# Patient Record
Sex: FEMALE | Race: WHITE | Employment: OTHER | URBAN - METROPOLITAN AREA
[De-identification: names, ages, dates, MRNs, and addresses within clinical notes are randomized per-mention and may not be internally consistent; named-entity substitution may affect disease eponyms.]

---

## 2022-01-20 ENCOUNTER — APPOINTMENT (OUTPATIENT)
Dept: CT IMAGING | Age: 67
End: 2022-01-20
Attending: NURSE PRACTITIONER
Payer: MEDICARE

## 2022-01-20 ENCOUNTER — HOSPITAL ENCOUNTER (OUTPATIENT)
Age: 67
Discharge: HOME | End: 2022-01-20
Payer: MEDICARE

## 2022-01-20 VITALS
TEMPERATURE: 97 F | OXYGEN SATURATION: 96 % | DIASTOLIC BLOOD PRESSURE: 82 MMHG | SYSTOLIC BLOOD PRESSURE: 147 MMHG | HEART RATE: 62 BPM | RESPIRATION RATE: 16 BRPM

## 2022-01-20 DIAGNOSIS — S09.92XA INJURY OF NOSE, INITIAL ENCOUNTER: Primary | ICD-10-CM

## 2022-01-20 DIAGNOSIS — S00.33XA CONTUSION OF NOSE, INITIAL ENCOUNTER: ICD-10-CM

## 2022-01-20 PROCEDURE — 70486 CT MAXILLOFACIAL W/O DYE: CPT | Performed by: NURSE PRACTITIONER

## 2022-01-20 PROCEDURE — 99213 OFFICE O/P EST LOW 20 MIN: CPT | Performed by: NURSE PRACTITIONER

## 2022-01-20 PROCEDURE — 76377 3D RENDER W/INTRP POSTPROCES: CPT | Performed by: NURSE PRACTITIONER

## 2022-01-20 RX ORDER — CLOBETASOL PROPIONATE 0.5 MG/G
1 OINTMENT TOPICAL DAILY
COMMUNITY
Start: 2022-01-06

## 2022-01-20 RX ORDER — LEVOTHYROXINE SODIUM 0.07 MG/1
TABLET ORAL
COMMUNITY
Start: 2022-01-11

## 2022-01-20 RX ORDER — BLOOD SUGAR DIAGNOSTIC
1 STRIP MISCELLANEOUS 2 TIMES DAILY
COMMUNITY
Start: 2022-01-01

## 2022-01-20 RX ORDER — EPINEPHRINE 0.3 MG/.3ML
0.3 INJECTION SUBCUTANEOUS
COMMUNITY
Start: 2020-09-21

## 2022-01-20 RX ORDER — ERGOCALCIFEROL (VITAMIN D2) 1250 MCG
1 CAPSULE ORAL
COMMUNITY
Start: 2021-12-14

## 2022-01-20 RX ORDER — ARMODAFINIL 150 MG/1
150 TABLET ORAL AS DIRECTED
COMMUNITY
Start: 2019-09-20

## 2022-01-20 RX ORDER — PNV NO.95/FERROUS FUM/FOLIC AC 28MG-0.8MG
TABLET ORAL EVERY OTHER DAY
COMMUNITY

## 2022-01-20 RX ORDER — FLUCONAZOLE 150 MG/1
150 TABLET ORAL AS DIRECTED
COMMUNITY
Start: 2021-11-03

## 2022-01-20 RX ORDER — MAGNESIUM OXIDE 400 MG (241.3 MG MAGNESIUM) TABLET
TABLET
COMMUNITY

## 2022-01-20 RX ORDER — AZELASTINE HYDROCHLORIDE 0.5 MG/ML
1 SOLUTION/ DROPS OPHTHALMIC 2 TIMES DAILY
COMMUNITY

## 2022-01-20 RX ORDER — EXENATIDE 2 MG/.85ML
2 INJECTION, SUSPENSION, EXTENDED RELEASE SUBCUTANEOUS
COMMUNITY
Start: 2021-12-20
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
## 2023-08-17 ENCOUNTER — HOSPITAL ENCOUNTER (OUTPATIENT)
Age: 68
Discharge: HOME | End: 2023-08-17
Payer: MEDICARE

## 2023-08-17 VITALS
HEIGHT: 67 IN | BODY MASS INDEX: 36.1 KG/M2 | WEIGHT: 230 LBS | HEART RATE: 73 BPM | OXYGEN SATURATION: 97 % | RESPIRATION RATE: 18 BRPM | DIASTOLIC BLOOD PRESSURE: 78 MMHG | SYSTOLIC BLOOD PRESSURE: 126 MMHG | TEMPERATURE: 98 F

## 2023-08-17 DIAGNOSIS — S39.012A LUMBAR STRAIN, INITIAL ENCOUNTER: Primary | ICD-10-CM

## 2023-08-17 LAB
BILIRUB UR QL STRIP: NEGATIVE
CLARITY UR: CLEAR
COLOR UR: YELLOW
GLUCOSE UR STRIP-MCNC: NEGATIVE MG/DL
HGB UR QL STRIP: NEGATIVE
KETONES UR STRIP-MCNC: NEGATIVE MG/DL
LEUKOCYTE ESTERASE UR QL STRIP: NEGATIVE
NITRITE UR QL STRIP: NEGATIVE
PH UR STRIP: 5 [PH]
PROT UR STRIP-MCNC: NEGATIVE MG/DL
SP GR UR STRIP: >=1.03
UROBILINOGEN UR STRIP-ACNC: <2 MG/DL

## 2023-08-17 PROCEDURE — 99213 OFFICE O/P EST LOW 20 MIN: CPT | Performed by: NURSE PRACTITIONER

## 2023-08-17 PROCEDURE — 81002 URINALYSIS NONAUTO W/O SCOPE: CPT | Performed by: NURSE PRACTITIONER

## 2023-08-17 RX ORDER — ORPHENADRINE CITRATE 100 MG/1
100 TABLET, EXTENDED RELEASE ORAL 2 TIMES DAILY
Qty: 20 EACH | Refills: 0 | Status: SHIPPED | OUTPATIENT
Start: 2023-08-17 | End: 2023-08-27

## 2023-08-17 RX ORDER — METHYLPREDNISOLONE 4 MG/1
TABLET ORAL
Qty: 1 EACH | Refills: 0 | Status: SHIPPED | OUTPATIENT
Start: 2023-08-17

## 2023-08-17 NOTE — ED INITIAL ASSESSMENT (HPI)
May Pt c/o intermittent lower back ache  June Pt Pt states back pain continues but did vomit with pink blood  July pain started to radiate to hip. Last wk Pt noted increased lower back pain, red blood to stool. Denies: fevers, issues with urination.

## 2024-04-21 ENCOUNTER — HOSPITAL ENCOUNTER (OUTPATIENT)
Age: 69
Discharge: OTHER INSTITUTION | End: 2024-04-21
Payer: MEDICARE

## 2024-04-21 VITALS
TEMPERATURE: 98 F | DIASTOLIC BLOOD PRESSURE: 94 MMHG | SYSTOLIC BLOOD PRESSURE: 144 MMHG | HEART RATE: 74 BPM | RESPIRATION RATE: 18 BRPM | OXYGEN SATURATION: 96 %

## 2024-04-21 DIAGNOSIS — R42 DIZZINESS: Primary | ICD-10-CM

## 2024-04-21 LAB — GLUCOSE BLD-MCNC: 96 MG/DL (ref 70–99)

## 2024-04-21 NOTE — ED PROVIDER NOTES
Patient Seen in: [facility]      History   No chief complaint on file.    Stated Complaint: dizzy    Subjective:   68-year-old female accompanied by a friend.  States that since Wednesday night Thursday, she started having dizziness.  Not sure if it is vertigo, as she has had vertigo in the past.  States it feels little bit different.  She denies chest pain.  She did have some nausea, vomiting, diarrhea, which has mostly resolved.  She has been drinking fluids including Gatorade.  States she is not had a dizziness quite like this in the past.  States she did not hit her head or lost consciousness.  Denies chest pain or shortness of breath.  She is a diabetic.  We did check a blood sugar and it was 96.            Objective:   No pertinent past medical history.            No pertinent past surgical history.              No pertinent social history.            Review of Systems   Constitutional: Negative.    Respiratory: Negative.     Cardiovascular: Negative.    Gastrointestinal: Negative.    Genitourinary: Negative.    Neurological:  Positive for dizziness and light-headedness.   All other systems reviewed and are negative.      Positive for stated complaint: dizzy  Other systems are as noted in HPI.  Constitutional and vital signs reviewed.      All other systems reviewed and negative except as noted above.    Physical Exam     ED Triage Vitals [04/21/24 1526]   BP (!) 144/94   Pulse 74   Resp 18   Temp 97.6 °F (36.4 °C)   Temp src Temporal   SpO2 96 %   O2 Device None (Room air)       Current:BP (!) 144/94   Pulse 74   Temp 97.6 °F (36.4 °C) (Temporal)   Resp 18   SpO2 96%         Physical Exam  Vitals and nursing note reviewed.   Constitutional:       General: She is not in acute distress.     Appearance: Normal appearance. She is not ill-appearing, toxic-appearing or diaphoretic.   HENT:      Mouth/Throat:      Lips: Pink.      Mouth: No angioedema.      Pharynx: Oropharynx is clear. Uvula  midline.   Cardiovascular:      Rate and Rhythm: Normal rate and regular rhythm.      Pulses: Normal pulses.      Heart sounds: Normal heart sounds.   Pulmonary:      Effort: Pulmonary effort is normal. No respiratory distress.      Breath sounds: Normal breath sounds.   Skin:     General: Skin is warm and dry.      Coloration: Skin is not jaundiced or pale.      Findings: No rash.   Neurological:      General: No focal deficit present.      Mental Status: She is alert and oriented to person, place, and time.   Psychiatric:         Mood and Affect: Mood normal.         Behavior: Behavior normal.               ED Course     Labs Reviewed   POCT GLUCOSE - Normal                             [facility]                                         Medical Decision Making  68-year-old female who has had dizziness for the last 3 days.  States that she does have a history of vertigo, however she feels that it feels different.  No vision changes.  No direct head injury or LOC.  No chest pain, no shortness of breath.  Normal blood sugar.  Vital signs are stable.  Due to the nature of her complaints, and limitations to her study at this time including the possible need for advanced imaging, I did recommend to her she go to the ER for further evaluation.  I did offer an ambulance ride which she refused.  States that she will have her friend accompany her to [facility] emergency department.  She understands to drive directly there, and to pull over to the side road and call 911 if she gets worse.  She did leave in stable condition.        Speech recognition software was used during this dictation.  There may be minor errors in transcription.      Amount and/or Complexity of Data Reviewed  Labs: ordered. Decision-making details documented in ED Course.        Disposition and Plan     Clinical Impression:  1. Dizziness         Disposition:  Ic to ed  4/21/2024  3:36 pm    Follow-up:  No follow-up provider specified.        Medications  Prescribed:  Current Discharge Medication List

## 2024-04-21 NOTE — ED INITIAL ASSESSMENT (HPI)
Wed/Thurs Pt c/o N/V/D which Pt states is better.    Thurs AM Pt states she was having dizziness/light headed    Denies: fevers, pain    Pt ambulates to exam room 1 without complications.

## 2025-05-28 ENCOUNTER — OFFICE VISIT (OUTPATIENT)
Dept: OBGYN CLINIC | Facility: CLINIC | Age: 70
End: 2025-05-28

## 2025-05-28 VITALS — BODY MASS INDEX: 34.37 KG/M2 | WEIGHT: 219 LBS | HEIGHT: 67 IN

## 2025-05-28 DIAGNOSIS — N95.2 POSTMENOPAUSAL ATROPHIC VAGINITIS: ICD-10-CM

## 2025-05-28 DIAGNOSIS — N90.4 LICHEN SCLEROSUS OF VULVA: Primary | ICD-10-CM

## 2025-05-28 PROCEDURE — 99203 OFFICE O/P NEW LOW 30 MIN: CPT | Performed by: OBSTETRICS & GYNECOLOGY

## 2025-05-28 RX ORDER — CLOBETASOL PROPIONATE 0.5 MG/G
OINTMENT TOPICAL
Qty: 60 G | Refills: 3 | Status: SHIPPED | OUTPATIENT
Start: 2025-05-28 | End: 2025-07-02

## 2025-05-28 RX ORDER — ESTRADIOL 0.1 MG/G
CREAM VAGINAL
Qty: 42.5 G | Refills: 4 | Status: SHIPPED | OUTPATIENT
Start: 2025-05-28

## 2025-05-28 NOTE — PROGRESS NOTES
[facility]  Obstetrics and Gynecology  Gynecology Visit    Chief Complaint   Patient presents with    Gyn Exam           Silva Gonzalez is a 69 year old female who presents for gyn exam.    LMP: postmenopausal.    Menses regular: n/a.    Menstrual flow normal: n/a.    Birth control or HRT:  0.   Refill 0  Last Pap Smear: n/a.  Any history of abnormal paps: n/a   Last MMG: n/a  Any Medication Refills needed today?: no  Sleep: 7-8 hours.    Diet: balanced.    Exercise: occasional.   Screening labs/Blood work today: n/a.     Colonoscopy (if over 46 y/o): n/a.   Gardasil:(age 9-46 y/o) n/a.   Genetic Cancer screen (if indicated): no.   Flu (Aug-April): n/a.TDAP (every 10 years) n/a.      Additional Problems/concerns: vaginal irritation.      Next Appt: n/a    Immunization History   Administered Date(s) Administered    Covid-19 Vaccine Moderna 100 mcg/0.5 ml 2021, 2021, 2021, 2022    Covid-19 Vaccine Pfizer Bivalent 30mcg/0.3mL 2023    Pfizer Covid-19 Vaccine 30mcg/0.3ml 12yrs+ 2024       Medications - Current[1]    Allergies[2]    OB History   No obstetric history on file.       Gyn History       No data recorded       No data to display                    Past Medical History[3]    Past Surgical History[4]    Family History[5]     Tobacco  Med Hx  Surg Hx  Fam Hx  Soc Hx        Social History     Socioeconomic History    Marital status: Single     Spouse name: Not on file    Number of children: Not on file    Years of education: Not on file    Highest education level: Not on file   Occupational History    Not on file   Tobacco Use    Smoking status: Former     Current packs/day: 0.00     Types: Cigarettes     Quit date: 2008     Years since quittin.0     Passive exposure: Never    Smokeless tobacco: Never    Tobacco comments:     Quit    Vaping Use    Vaping status: Never Used   Substance and Sexual Activity    Alcohol use: Yes     Comment: 2 drinks per week on  the high side    Drug use: Not Currently     Types: Cannabis     Comment: as per directed by Dr. Murillo, never applied for [place]-cost    Sexual activity: Not on file   Other Topics Concern     Service Not Asked    Blood Transfusions Not Asked    Caffeine Concern Yes     Comment: 2 drinks/day    Occupational Exposure Not Asked    Hobby Hazards Not Asked    Sleep Concern Not Asked    Stress Concern Not Asked    Weight Concern Not Asked    Special Diet Not Asked    Back Care Not Asked    Exercise Not Asked    Bike Helmet Not Asked    Seat Belt Not Asked    Self-Exams Not Asked   Social History Narrative    Not on file     Social Drivers of Health     Food Insecurity: No Food Insecurity (3/4/2025)    Received from [facility]    Food Insecurity     Currently or in the past 3 months, have you worried your food would run out before you had money to buy more?: No     In the past 12 months, have you run out of food or been unable to get more?: No   Transportation Needs: No Transportation Needs (3/4/2025)    Received from [facility]    Transportation Needs     Currently or in the past 3 months, has lack of transportation kept you from medical appointments, getting food or medicine, or providing care to a family member?: No     Non-Medical Transportation Needs?: Not on file     Medical Transportation Needs?: No     Daily Living Transportation Needs? [Peds Only] : Not on file   Stress: Not on file   Housing Stability: Low Risk  (7/8/2021)    Received from [facility]    Housing Stability     Mortgage Payment Concerns?: Not on file     Number of Places Lived in the Last Year: Not on file     Unstable Housing?: Not on file         Ht 5' 7\" (1.702 m)   Wt 219 lb (99.3 kg)   Wt Readings from Last 3 Encounters:   05/28/25 219 lb (99.3 kg)   08/17/23 230 lb (104.3 kg)     Health Maintenance   Topic Date Due    Annual Physical  Never done    Colorectal Cancer  Screening  Never done    Mammogram  Never done    Pneumococcal Vaccine: 50+ Years (1 of 1 - PCV) Never done    Zoster Vaccines (1 of 2) Never done    DEXA Scan  Never done    COVID-19 Vaccine (7 - 2024-25 season) 09/01/2024    Annual Depression Screening  Never done    Fall Risk Screening (Annual)  01/01/2025    Influenza Vaccine  Completed    Meningococcal B Vaccine  Aged Out           Review of Systems   General: Present- Feeling well. Not Present- Chills, Fever, Weight Gain and Weight Loss.  HEENT: Not Present- Headache and Sore Throat.  Respiratory: Not Present- Cough, Difficulty Breathing, Hemoptysis and Sputum Production.  Breast: Not Present- Breast Mass, Breast Pain and Nipple Discharge.  Cardiovascular: Not Present- Chest Pain, Elevated Blood Pressure, Fainting / Blacking Out and Shortness of Breath.  Gastrointestinal: Not Present- Bloody Stool, Change in Bowel Habits, Constipation, Diarrhea, Heartburn, Nausea, Bloating and Vomiting.  Female Genitourinary: Not Present- Discharge, Dysuria, Frequency, Incontinence, Pelvic Pain, Urgency, Urinating at Night, Vaginal Bleeding, Vaginal Discharge, Vaginal dryness and Vaginal itching/burning.  Musculoskeletal: Not Present- Leg Cramps and Swelling of Extremities.  Neurological: Not Present- Dizziness and Headaches.  Psychiatric: Not Present- Anxiety and Depression.  Endocrine: Not Present- Appetite Changes, Hair Changes and Thyroid Problems.  Hematology: Not Present- Blood Clots, Easy Bruising and Excessive bleeding.  All other systems negative       Physical Exam   The physical exam findings are as follows:       General   Mental Status - Alert. General Appearance - Cooperative. Orientation - Oriented X4. Build & Nutrition - Well nourished.    Female Genitourinary     External Genitalia   Perineum - Normal. Bartholin's Gland - Bilateral - Normal. Clitoris - Normal.  Introitus: Characteristics - No Cystocele, Enterocele or Rectocele. Discharge - None.  Labia Majora:  atrophic, thin skin and white appearing   Urethra: Characteristics - Normal. Discharge - None.  [place] Gland - Bilateral - Normal.  Vulva: Lesions - None.    Speculum & Bimanual   Vagina: atrophic   Vaginal Wall: - Normal.  Vaginal Lesions - None. Vaginal Mucosa - Normal.  Cervix: Characteristics - No Motion tenderness. Discharge - None.  Uterus: Characteristics - Normal. Position - Midposition.  Adnexa: Characteristics - Bilateral - Normal. Masses - No Adnexal Masses.  Wet [place]: Main patient complaints - None.     Rectal   Anorectal Exam: External - normal external exam.    Peripheral Vascular   Upper Extremity:   Palpation: - Pulses bilaterally normal.  Lower Extremity: Inspection - Bilateral - Inspection Normal.  Palpation: Edema - Bilateral - No edema.    Neurologic   Mental Status: - Normal.    Lymphatic  General Lymphatics   Description - Normal .    1. Lichen sclerosus of vulva    2. Postmenopausal atrophic vaginitis                     [1]   Current Outpatient Medications:     clobetasol 0.05 % External Ointment, Apply 1 Tube topically daily for 7 days, THEN 1 Tube 2 (two) times daily for 7 days, THEN 1 Tube in the morning, at noon, and at bedtime for 7 days, THEN 1 Tube 2 (two) times daily for 7 days, THEN 1 Tube daily for 7 days., Disp: 60 g, Rfl: 3    estradiol (ESTRACE) 0.1 MG/GM Vaginal Cream, Place 1 g vaginally every evening for 7 days, THEN 1 g twice a week., Disp: 42.5 g, Rfl: 4    ATORVASTATIN CALCIUM OR, Take by mouth., Disp: , Rfl:     methylPREDNISolone (MEDROL) 4 MG Oral Tablet Therapy Pack, Dosepack: take as directed, Disp: 1 each, Rfl: 0    Ferrous Sulfate (IRON) 325 (65 Fe) MG Oral Tab, Take by mouth every other day., Disp: , Rfl:     Calcium Carbonate 1250 (500 Ca) MG Oral Chew Tab, Chew by mouth., Disp: , Rfl:     Armodafinil 150 MG Oral Tab, Take 150 mg by mouth As Directed., Disp: , Rfl:     ascorbic acid 1000 MG Oral Tab, Take 1 tablet (1,000 mg total) by mouth daily., Disp: , Rfl:      Azelastine HCl 0.05 % Ophthalmic Solution, 1 drop 2 (two) times daily., Disp: , Rfl:     Budesonide 2 MG/ACT Rectal Foam, Use rectally twice daily for 2 weeks and then once daily for 4 weeks, Disp: , Rfl:     Calcium Carbonate Antacid 1000 MG Oral Chew Tab, Chew 4 tablets by mouth daily as needed., Disp: , Rfl:     Cholecalciferol 50 MCG (2000 UT) Oral Cap, Take by mouth As Directed., Disp: , Rfl:     Clobetasol Propionate 0.05 % External Ointment, Apply 1 Application topically daily., Disp: , Rfl:     EPINEPHrine 0.3 MG/0.3ML Injection Solution Auto-injector, Inject 0.3 mL (1 each total) into the muscle., Disp: , Rfl:     ergocalciferol 1.25 MG (19674 UT) Oral Cap, Take 1 capsule (50,000 Units total) by mouth every 14 (fourteen) days., Disp: , Rfl:     Exenatide ER ([facility]) 2 MG/0.85ML Subcutaneous Auto-injector, Inject 2 mg into the skin., Disp: , Rfl:     fluconazole 150 MG Oral Tab, Take 1 tablet (150 mg total) by mouth As Directed., Disp: , Rfl:     Glucose Blood (CONTOUR NEXT TEST) In Vitro Strip, Take 1 tablet by mouth 2 (two) times daily., Disp: , Rfl:     Lactase 9000 units Oral Tab, Take 1 tablet by mouth 3 (three) times daily., Disp: , Rfl:     levothyroxine 75 MCG Oral Tab, TAKE 1 TABLET BY MOUTH ONCE DAILY 6 DAYS A WEEK, Disp: , Rfl:     metFORMIN 500 MG Oral Tab, Take 1 tablet (500 mg total) by mouth., Disp: , Rfl:     Vedolizumab 300 MG Intravenous Recon Soln, Inject 300 mg into the vein., Disp: , Rfl:   [2]   Allergies  Allergen Reactions    Adhesive Tape OTHER (SEE COMMENTS) and RASH     And blisters with med patches    Aluminum Carbonate OTHER (SEE COMMENTS), UNKNOWN and SWELLING     Pt was unable to urinate  Pt was unable to urinate      Azithromycin SWELLING, ANAPHYLAXIS and SHORTNESS OF BREATH     diff breathing  Respiratory Distress      Cephalosporins OTHER (SEE COMMENTS) and SHORTNESS OF BREATH     Respiratory Distress      Erythromycin OTHER (SEE COMMENTS)     other  Can take liquid  for 4 days.  After 4 days, breathing/rash      Ketoconazole Coughing     Reacted to topical ketoconazole cream    Levothyroxine UNKNOWN    Meperidine ANAPHYLAXIS    Metformin HIVES, OTHER (SEE COMMENTS) and SWELLING     Tongue swelling  Rash 1/20  Only metformin ER per pt  Raw tongue      Quinolones HIVES and SHORTNESS OF BREATH     Respiratory Distress  Respiratory Distress      Vedolizumab SHORTNESS OF BREATH and OTHER (SEE COMMENTS)     Ringing in ears / tinnitis      Bactrim     Bogbalpd-Obttoro-Etyypo [Fluocinolone Acetonide]     Demerol UNKNOWN    Penicillins     Sulfa Antibiotics     Tetracycline Hcl     Clindamycin DIARRHEA    Wheat Bran DIARRHEA     Rye and barley     [3]   Past Medical History:   Anemia    Benign paroxysmal positional vertigo    Cancer (HCC)    radiation only    Contusion of unspecified part of lower limb    Contusion of unspecified part of lower limb    Crohn's disease (HCC)    Diabetes (HCC)    Diabetes mellitus (HCC)    6.4-7.7 (High now)    Diaphragmatic hernia without mention of obstruction or gangrene    Dizziness and giddiness    vertigo    Endometriosis    hysterectomy '93    Esophageal reflux    Esophageal reflux    GERD    Fibroids    \"    Hemorrhage of rectum and anus    Hormone disorder    thyroid, allergies to hormones    Hyperlipidemia    b/w    Infertility, female    dx, fibroids, endometriosis, tipped uterus, was told most likely unable to have children    Other dyspnea and respiratory abnormality    Pain in limb    Leg    Palpitations    Thyroid disease    Unspecified asthma(493.90)    asthma-like reaction to moldsd    Viral infection characterized by skin and mucous membrane lesions    derma   [4]   Past Surgical History:  Procedure Laterality Date    Abdominal surgery  93 and 89    hysterectomy    Breast surgery  2016    breast cancer    Cholecystectomy      Cyst removal      at hysterectomy grapefruit size    D & c      younger    Hysterectomy      Complete no cancer     Oophorectomy      only 1 ovary since birth    Other surgical history      gallbladder, infection after hysterectomy   [5]   Family History  Problem Relation Age of Onset    Cancer Father         Lung    Cancer Mother         Lung    Heart Disease Mother     Colon Cancer Mother     Diabetes Brother     Substance Abuse Brother     Cancer Paternal Grandfather     Cancer Paternal Grandmother     Breast Cancer Paternal Grandmother

## 2025-06-02 ENCOUNTER — TELEPHONE (OUTPATIENT)
Dept: OBGYN CLINIC | Facility: CLINIC | Age: 70
End: 2025-06-02

## 2025-06-02 NOTE — TELEPHONE ENCOUNTER
clobetasol 0.05 % External Ointment     estradiol (ESTRACE) 0.1 MG/GM Vaginal Cream   Patient called states she need these medication sent to [facility] through [employer] any question patient ask that someone please give her a call

## 2025-06-12 ENCOUNTER — TELEPHONE (OUTPATIENT)
Dept: OBGYN CLINIC | Facility: CLINIC | Age: 70
End: 2025-06-12

## 2025-06-12 NOTE — TELEPHONE ENCOUNTER
Spoke with [facility].    Medication is not covered, patient unable to proceed as self pay. Would have to have Rx sent to a local pharmacy.    Call placed to patient, unable to leave voicemail.

## 2025-06-13 ENCOUNTER — TELEPHONE (OUTPATIENT)
Dept: OBGYN CLINIC | Facility: CLINIC | Age: 70
End: 2025-06-13

## 2025-06-13 NOTE — TELEPHONE ENCOUNTER
Pt name and  verified     Informed pt of call from [employer]. Pt states she received an email from [facility] and medication will be arriving next week. Pt states she will call if she has any difficulties.

## 2025-06-13 NOTE — TELEPHONE ENCOUNTER
Medication recently prescribed through [facility] is not covered. They are calling to request betamethasone be prescribed in its place. Please call to discuss using reference #[id number].